# Patient Record
Sex: MALE | Race: WHITE
[De-identification: names, ages, dates, MRNs, and addresses within clinical notes are randomized per-mention and may not be internally consistent; named-entity substitution may affect disease eponyms.]

---
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
## 2024-08-05 ENCOUNTER — HOSPITAL ENCOUNTER (EMERGENCY)
Dept: HOSPITAL 47 - EC | Age: 19
Discharge: HOME | End: 2024-08-05
Payer: COMMERCIAL
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
## 2024-08-05 DIAGNOSIS — S83.91XA: Primary | ICD-10-CM
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
## 2024-08-05 PROCEDURE — 99282 EMERGENCY DEPT VISIT SF MDM: CPT

## 2024-08-05 PROCEDURE — 29505 APPLICATION LONG LEG SPLINT: CPT

## 2024-09-11 ENCOUNTER — HOSPITAL ENCOUNTER (OUTPATIENT)
Dept: HOSPITAL 47 - RADMRIMAIN | Age: 19
Discharge: HOME | End: 2024-09-11
Attending: ORTHOPAEDIC SURGERY
Payer: COMMERCIAL
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
## 2025-03-16 ENCOUNTER — HOSPITAL ENCOUNTER (EMERGENCY)
Dept: HOSPITAL 47 - EC | Age: 20
Discharge: HOME | End: 2025-03-16
Payer: COMMERCIAL

## 2025-03-16 VITALS — DIASTOLIC BLOOD PRESSURE: 90 MMHG | HEART RATE: 77 BPM | SYSTOLIC BLOOD PRESSURE: 136 MMHG | TEMPERATURE: 97.8 F

## 2025-03-16 VITALS — RESPIRATION RATE: 20 BRPM

## 2025-03-16 DIAGNOSIS — F17.290: ICD-10-CM
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
## 2025-03-16 DIAGNOSIS — S16.1XXA: Primary | ICD-10-CM

## 2025-03-16 DIAGNOSIS — M62.838: ICD-10-CM

## 2025-03-16 PROCEDURE — 96372 THER/PROPH/DIAG INJ SC/IM: CPT

## 2025-03-16 PROCEDURE — 72050 X-RAY EXAM NECK SPINE 4/5VWS: CPT

## 2025-03-16 PROCEDURE — 99283 EMERGENCY DEPT VISIT LOW MDM: CPT

## 2025-03-16 RX ADMIN — LIDOCAINE ONE PATCH: 4 PATCH TOPICAL at 13:02

## 2025-03-16 NOTE — XR
EXAMINATION TYPE: XR cervical spine comp

 

DATE OF EXAM: 3/16/2025 12:35 PM

 

INDICATION: 

Patient age:Male;  19 years old; 

Reason for study: pain; PHH, pain

 

COMPARISON: None

 

TECHNIQUE: The cervical spine was imaged in 4 projections. Frontal, lateral, odontoid and bilateral o
blique.

 

FINDINGS:

No acute fracture. No disc space narrowing. No spondylolisthesis. Reversal of the normal cervical johnathan
dosis which may be due to patient position versus muscle spasm. The intervertebral disk spaces are pr
eserved.  Pedicles are intact.  Soft tissues are within normal limits. The odontoid appears intact.

 

IMPRESSION: 

No fracture or dislocation.

 

[facility], Workstation: [id number], 3/16/2025 12:49 PM
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
